# Patient Record
Sex: FEMALE | URBAN - METROPOLITAN AREA
[De-identification: names, ages, dates, MRNs, and addresses within clinical notes are randomized per-mention and may not be internally consistent; named-entity substitution may affect disease eponyms.]

---

## 2020-08-12 ENCOUNTER — APPOINTMENT (RX ONLY)
Dept: URBAN - METROPOLITAN AREA CLINIC 11 | Facility: CLINIC | Age: 60
Setting detail: DERMATOLOGY
End: 2020-08-12

## 2020-08-12 DIAGNOSIS — Z41.1 ENCOUNTER FOR COSMETIC SURGERY: ICD-10-CM

## 2020-08-12 PROCEDURE — 99003: CPT

## 2020-08-12 PROCEDURE — ? SKIN CARE REGIMEN

## 2020-08-12 PROCEDURE — ? CONSULTATION - AESTHETIC FACIAL DEFORMITY

## 2020-08-12 ASSESSMENT — LOCATION DETAILED DESCRIPTION DERM
LOCATION DETAILED: LEFT LATERAL INFERIOR EYELID
LOCATION DETAILED: LEFT MEDIAL MANDIBULAR CHEEK
LOCATION DETAILED: LEFT ANTERIOR EARLOBE
LOCATION DETAILED: RIGHT ANTERIOR EARLOBE
LOCATION DETAILED: RIGHT LATERAL INFERIOR EYELID
LOCATION DETAILED: RIGHT SUPERIOR VERMILION LIP
LOCATION DETAILED: RIGHT MEDIAL MANDIBULAR CHEEK
LOCATION DETAILED: LEFT SUPERIOR VERMILION LIP

## 2020-08-12 ASSESSMENT — LOCATION ZONE DERM
LOCATION ZONE: EAR
LOCATION ZONE: LIP
LOCATION ZONE: EYELID
LOCATION ZONE: FACE

## 2020-08-12 ASSESSMENT — LOCATION SIMPLE DESCRIPTION DERM
LOCATION SIMPLE: LEFT CHEEK
LOCATION SIMPLE: LEFT INFERIOR EYELID
LOCATION SIMPLE: RIGHT CHEEK
LOCATION SIMPLE: RIGHT EAR
LOCATION SIMPLE: RIGHT INFERIOR EYELID
LOCATION SIMPLE: LEFT LIP
LOCATION SIMPLE: RIGHT LIP
LOCATION SIMPLE: LEFT EAR

## 2020-08-12 NOTE — HPI: FACE (AESTHETIC DEFORMITY, FACE)
Is This A New Presentation, Or A Follow-Up?: Facial Aesthetic Deformity
Additional History: Patient referred by her cosmetic injector, Vivian Mchugh.

## 2020-09-15 ENCOUNTER — APPOINTMENT (RX ONLY)
Dept: URBAN - METROPOLITAN AREA CLINIC 11 | Facility: CLINIC | Age: 60
Setting detail: DERMATOLOGY
End: 2020-09-15

## 2020-09-15 DIAGNOSIS — Z41.1 ENCOUNTER FOR COSMETIC SURGERY: ICD-10-CM

## 2020-09-15 PROCEDURE — ? CONSULTATION - AESTHETIC FACIAL DEFORMITY

## 2020-09-15 PROCEDURE — 99005: CPT

## 2020-09-15 ASSESSMENT — LOCATION ZONE DERM
LOCATION ZONE: EAR
LOCATION ZONE: EYELID
LOCATION ZONE: LIP
LOCATION ZONE: FACE
LOCATION ZONE: SCALP

## 2020-09-15 ASSESSMENT — LOCATION DETAILED DESCRIPTION DERM
LOCATION DETAILED: LEFT SUPERIOR PREAURICULAR CHEEK
LOCATION DETAILED: SUBMENTAL CHIN
LOCATION DETAILED: RIGHT SUPERIOR PREAURICULAR CHEEK
LOCATION DETAILED: RIGHT ANTERIOR EARLOBE
LOCATION DETAILED: LEFT ANTERIOR EARLOBE
LOCATION DETAILED: RIGHT SUPERIOR VERMILION LIP
LOCATION DETAILED: RIGHT CENTRAL POSTAURICULAR SKIN
LOCATION DETAILED: LEFT CENTRAL POSTAURICULAR SKIN
LOCATION DETAILED: LEFT LATERAL INFERIOR EYELID
LOCATION DETAILED: LEFT SUPERIOR VERMILION LIP
LOCATION DETAILED: RIGHT LATERAL INFERIOR EYELID

## 2020-09-15 ASSESSMENT — LOCATION SIMPLE DESCRIPTION DERM
LOCATION SIMPLE: SCALP
LOCATION SIMPLE: SUBMENTAL CHIN
LOCATION SIMPLE: LEFT LIP
LOCATION SIMPLE: RIGHT INFERIOR EYELID
LOCATION SIMPLE: RIGHT CHEEK
LOCATION SIMPLE: LEFT INFERIOR EYELID
LOCATION SIMPLE: LEFT CHEEK
LOCATION SIMPLE: RIGHT LIP
LOCATION SIMPLE: LEFT EAR
LOCATION SIMPLE: RIGHT EAR

## 2020-09-15 NOTE — PROCEDURE: CONSULTATION - AESTHETIC FACIAL DEFORMITY
Consultation Charge $ (Use Numbers Only, No Text Please.): 50.00
Send Procedure Quote As Charge: No
Detail Level: Zone

## 2020-10-08 ENCOUNTER — APPOINTMENT (RX ONLY)
Dept: URBAN - METROPOLITAN AREA CLINIC 11 | Facility: CLINIC | Age: 60
Setting detail: DERMATOLOGY
End: 2020-10-08

## 2020-10-08 DIAGNOSIS — Z41.9 ENCOUNTER FOR PROCEDURE FOR PURPOSES OTHER THAN REMEDYING HEALTH STATE, UNSPECIFIED: ICD-10-CM

## 2020-10-08 PROCEDURE — ? PRESCRIPTION

## 2020-10-08 PROCEDURE — ? PRE-OP WORKLIST

## 2020-10-08 PROCEDURE — 99004: CPT

## 2020-10-08 RX ORDER — CEPHALEXIN 500 MG/1
CAPSULE ORAL
Qty: 28 | Refills: 0 | Status: ERX | COMMUNITY
Start: 2020-10-08

## 2020-10-08 RX ORDER — ACYCLOVIR 200 MG/1
CAPSULE ORAL
Qty: 30 | Refills: 0 | Status: ERX | COMMUNITY
Start: 2020-10-08

## 2020-10-08 RX ORDER — ONDANSETRON HYDROCHLORIDE 4 MG/1
TABLET, FILM COATED ORAL
Qty: 20 | Refills: 0 | Status: ERX | COMMUNITY
Start: 2020-10-08

## 2020-10-08 RX ORDER — OXYCODONE HYDROCHLORIDE AND ACETAMINOPHEN 5; 325 MG/1; MG/1
TABLET ORAL
Qty: 30 | Refills: 0 | COMMUNITY
Start: 2020-10-08

## 2020-10-08 RX ADMIN — ACYCLOVIR: 200 CAPSULE ORAL at 00:00

## 2020-10-08 RX ADMIN — CEPHALEXIN: 500 CAPSULE ORAL at 00:00

## 2020-10-08 RX ADMIN — ONDANSETRON HYDROCHLORIDE: 4 TABLET, FILM COATED ORAL at 00:00

## 2020-10-08 RX ADMIN — OXYCODONE HYDROCHLORIDE AND ACETAMINOPHEN: 5; 325 TABLET ORAL at 00:00

## 2020-10-08 ASSESSMENT — LOCATION DETAILED DESCRIPTION DERM
LOCATION DETAILED: RIGHT MEDIAL INFERIOR EYELID
LOCATION DETAILED: LEFT SUPERIOR VERMILION LIP
LOCATION DETAILED: LEFT ANTERIOR EARLOBE
LOCATION DETAILED: RIGHT LATERAL ZYGOMA
LOCATION DETAILED: RIGHT ANTERIOR EARLOBE
LOCATION DETAILED: RIGHT SUPERIOR VERMILION LIP
LOCATION DETAILED: LEFT SUPERIOR PREAURICULAR CHEEK
LOCATION DETAILED: LEFT MEDIAL INFERIOR EYELID
LOCATION DETAILED: SUBMENTAL CHIN
LOCATION DETAILED: RIGHT SUPERIOR POSTAURICULAR SKIN
LOCATION DETAILED: LEFT CENTRAL POSTAURICULAR SKIN

## 2020-10-08 ASSESSMENT — LOCATION SIMPLE DESCRIPTION DERM
LOCATION SIMPLE: RIGHT LIP
LOCATION SIMPLE: RIGHT ZYGOMA
LOCATION SIMPLE: LEFT EAR
LOCATION SIMPLE: SCALP
LOCATION SIMPLE: LEFT LIP
LOCATION SIMPLE: LEFT CHEEK
LOCATION SIMPLE: LEFT INFERIOR EYELID
LOCATION SIMPLE: SUBMENTAL CHIN
LOCATION SIMPLE: RIGHT INFERIOR EYELID
LOCATION SIMPLE: RIGHT EAR

## 2020-10-08 ASSESSMENT — LOCATION ZONE DERM
LOCATION ZONE: EAR
LOCATION ZONE: FACE
LOCATION ZONE: SCALP
LOCATION ZONE: LIP
LOCATION ZONE: EYELID

## 2020-10-08 NOTE — HPI: PREOPERATIVE APPOINTMENT
Has The Patient Completed Informed Consent?: is scheduled to complete informed consent documentation during this visit
Has The Patient Fulfilled Financial Responsibilities For Surgical Scheduling?: is scheduled to complete payment to fulfill financial responsibilities during this visit
Have Arrangements And Instructions Been Made For Patient Transportation?: transportation arrangements will be made
Date Of Procedure?: 10/21/2020
Facility: [facility]
Surgeon: Dr. Edvin Garcia

## 2020-10-08 NOTE — PROCEDURE: PRE-OP WORKLIST
Admission Status: outpatient
Surgery Scheduled: lower face and necklift, bilateral lower blepharoplasty, bilateral lower eyelid phenol croton oil chemical peel, lip lift, bilateral earlobe reduction
Date Of Surgery: 10/21/2020
Photos Taken?: yes
Detail Level: Simple
Date Of Evaluation: 10/08/2020
Surgeon: Dr. Garcia

## 2020-10-19 ENCOUNTER — APPOINTMENT (RX ONLY)
Dept: URBAN - METROPOLITAN AREA CLINIC 11 | Facility: CLINIC | Age: 60
Setting detail: DERMATOLOGY
End: 2020-10-19

## 2020-10-19 DIAGNOSIS — Z41.1 ENCOUNTER FOR COSMETIC SURGERY: ICD-10-CM

## 2020-10-19 PROCEDURE — ? CONSULTATION - AESTHETIC FACIAL DEFORMITY

## 2020-10-19 PROCEDURE — 99005: CPT

## 2020-10-19 ASSESSMENT — LOCATION SIMPLE DESCRIPTION DERM
LOCATION SIMPLE: LEFT CHEEK
LOCATION SIMPLE: RIGHT EAR
LOCATION SIMPLE: SUBMENTAL CHIN
LOCATION SIMPLE: SCALP
LOCATION SIMPLE: RIGHT CHEEK
LOCATION SIMPLE: LEFT LIP
LOCATION SIMPLE: RIGHT INFERIOR EYELID
LOCATION SIMPLE: RIGHT LIP
LOCATION SIMPLE: LEFT EAR
LOCATION SIMPLE: LEFT INFERIOR EYELID

## 2020-10-19 ASSESSMENT — LOCATION DETAILED DESCRIPTION DERM
LOCATION DETAILED: RIGHT LATERAL INFERIOR EYELID
LOCATION DETAILED: RIGHT ANTERIOR EARLOBE
LOCATION DETAILED: SUBMENTAL CHIN
LOCATION DETAILED: LEFT SUPERIOR PREAURICULAR CHEEK
LOCATION DETAILED: LEFT ANTERIOR EARLOBE
LOCATION DETAILED: RIGHT SUPERIOR PREAURICULAR CHEEK
LOCATION DETAILED: RIGHT SUPERIOR VERMILION LIP
LOCATION DETAILED: RIGHT CENTRAL POSTAURICULAR SKIN
LOCATION DETAILED: LEFT CENTRAL POSTAURICULAR SKIN
LOCATION DETAILED: LEFT LATERAL INFERIOR EYELID
LOCATION DETAILED: LEFT SUPERIOR VERMILION LIP

## 2020-10-19 ASSESSMENT — LOCATION ZONE DERM
LOCATION ZONE: SCALP
LOCATION ZONE: LIP
LOCATION ZONE: EYELID
LOCATION ZONE: EAR
LOCATION ZONE: FACE

## 2020-10-21 ENCOUNTER — APPOINTMENT (RX ONLY)
Dept: URBAN - METROPOLITAN AREA SURGERY CENTER 4 | Facility: SURGERY CENTER | Age: 60
Setting detail: DERMATOLOGY
End: 2020-10-21

## 2020-10-21 DIAGNOSIS — Z41.1 ENCOUNTER FOR COSMETIC SURGERY: ICD-10-CM

## 2020-10-21 PROCEDURE — ? SET GLOBAL PERIOD

## 2020-10-21 PROCEDURE — 15828 RHYTIDECTOMY CHEEK CHN & NCK: CPT | Mod: 50

## 2020-10-21 PROCEDURE — 40799 UNLISTED PROCEDURE LIPS: CPT

## 2020-10-21 PROCEDURE — 17999 UNLISTD PX SKN MUC MEMB SUBQ: CPT

## 2020-10-21 PROCEDURE — 15789 CHEMICAL PEEL FACIAL DERMAL: CPT | Mod: 50

## 2020-10-21 PROCEDURE — 15821 BLEPHARP LWR EYELID FAT PAD: CPT | Mod: 50

## 2020-10-21 NOTE — PROCEDURE: SET GLOBAL PERIOD
Other Surgery Performed: Bilateral SMAS Facelift with Platysmaplasty, Bilateral Lower Blepharoplasty and Chemical Peel, Liplift, Bilateral Earlobe Reduction
Detail Level: Simple
Surgery Global Period: 90
Date Of Surgery (Mm/Dd/Yyyy): 10/21/2020

## 2020-10-21 NOTE — HPI: PROCEDURE
Additional History: Bilateral SMAS Facelift with Platysmaplasty \\nBilateral Lower Blepharoplasty and Chemical Peel\\nLiplift \\nBilateral Earlobe Reduction

## 2020-10-22 ENCOUNTER — APPOINTMENT (RX ONLY)
Dept: URBAN - METROPOLITAN AREA CLINIC 11 | Facility: CLINIC | Age: 60
Setting detail: DERMATOLOGY
End: 2020-10-22

## 2020-10-22 DIAGNOSIS — Z48.89 ENCOUNTER FOR OTHER SPECIFIED SURGICAL AFTERCARE: ICD-10-CM

## 2020-10-22 DIAGNOSIS — Z42.8 ENCOUNTER FOR OTHER PLASTIC AND RECONSTRUCTIVE SURGERY FOLLOWING MEDICAL PROCEDURE OR HEALED INJURY: ICD-10-CM

## 2020-10-22 PROCEDURE — ? COUNSELING - POST-OP CHECK, BLEPHAROPLASTY

## 2020-10-22 PROCEDURE — ? DRAIN REMOVAL

## 2020-10-22 PROCEDURE — ? COUNSELING - POST-OP CHECK, FACELIFT

## 2020-10-22 PROCEDURE — ? COUNSELING - POST-OP CHECK

## 2020-10-22 PROCEDURE — 99024 POSTOP FOLLOW-UP VISIT: CPT

## 2020-10-22 PROCEDURE — ? PATIENT SPECIFIC COUNSELING

## 2020-10-22 ASSESSMENT — LOCATION DETAILED DESCRIPTION DERM
LOCATION DETAILED: RIGHT SUPERIOR PREAURICULAR CHEEK
LOCATION DETAILED: LEFT CENTRAL POSTAURICULAR SKIN
LOCATION DETAILED: RIGHT CENTRAL POSTAURICULAR SKIN
LOCATION DETAILED: LEFT LATERAL INFERIOR EYELID
LOCATION DETAILED: RIGHT SUPERIOR VERMILION LIP
LOCATION DETAILED: LEFT SUPERIOR PREAURICULAR CHEEK
LOCATION DETAILED: SUBMENTAL CHIN
LOCATION DETAILED: RIGHT MEDIAL INFERIOR EYELID
LOCATION DETAILED: LEFT INFERIOR VERMILION LIP

## 2020-10-22 ASSESSMENT — LOCATION SIMPLE DESCRIPTION DERM
LOCATION SIMPLE: LEFT INFERIOR EYELID
LOCATION SIMPLE: RIGHT LIP
LOCATION SIMPLE: LEFT LIP
LOCATION SIMPLE: LEFT CHEEK
LOCATION SIMPLE: RIGHT INFERIOR EYELID
LOCATION SIMPLE: SUBMENTAL CHIN
LOCATION SIMPLE: SCALP
LOCATION SIMPLE: RIGHT CHEEK

## 2020-10-22 ASSESSMENT — LOCATION ZONE DERM
LOCATION ZONE: FACE
LOCATION ZONE: SCALP
LOCATION ZONE: LIP
LOCATION ZONE: EYELID

## 2020-10-22 ASSESSMENT — PAIN INTENSITY VAS: HOW INTENSE IS YOUR PAIN 0 BEING NO PAIN, 10 BEING THE MOST SEVERE PAIN POSSIBLE?: 5/10 PAIN

## 2020-10-22 NOTE — PROCEDURE: PATIENT SPECIFIC COUNSELING
Other (Free Text): Exam within normal limits. Continue aquafor/lidocaine mixture, once redness is gone she can switch to vaseline
Detail Level: Simple

## 2020-10-22 NOTE — HPI: POST-OP CHECK, COSMETIC (EXTENDED)
What Best Describes The Level Of Symmetry? (Check All That Apply): good
How Severe Is Your Pain?: mild
How Is Your Wound Healing?: fresh
Compared To The Last Evaluation, How Have The Findings Changed?: stable
Date Of Procedure: 10/21/2020

## 2020-10-28 ENCOUNTER — APPOINTMENT (RX ONLY)
Dept: URBAN - METROPOLITAN AREA CLINIC 11 | Facility: CLINIC | Age: 60
Setting detail: DERMATOLOGY
End: 2020-10-28

## 2020-10-28 DIAGNOSIS — Z48.89 ENCOUNTER FOR OTHER SPECIFIED SURGICAL AFTERCARE: ICD-10-CM

## 2020-10-28 DIAGNOSIS — Z42.8 ENCOUNTER FOR OTHER PLASTIC AND RECONSTRUCTIVE SURGERY FOLLOWING MEDICAL PROCEDURE OR HEALED INJURY: ICD-10-CM

## 2020-10-28 PROCEDURE — ? PRESCRIPTION

## 2020-10-28 PROCEDURE — ? SUTURE REMOVAL

## 2020-10-28 PROCEDURE — 99024 POSTOP FOLLOW-UP VISIT: CPT

## 2020-10-28 PROCEDURE — ? COUNSELING - POST-OP CHECK, BLEPHAROPLASTY

## 2020-10-28 PROCEDURE — ? COUNSELING - POST-OP CHECK, FACELIFT

## 2020-10-28 PROCEDURE — ? PATIENT SPECIFIC COUNSELING

## 2020-10-28 PROCEDURE — ? COUNSELING - POST-OP CHECK

## 2020-10-28 RX ORDER — SILVER SULFADIAZINE 10 MG/G
CREAM TOPICAL
Qty: 1 | Refills: 3 | Status: ERX | COMMUNITY
Start: 2020-10-28

## 2020-10-28 RX ADMIN — SILVER SULFADIAZINE: 10 CREAM TOPICAL at 00:00

## 2020-10-28 ASSESSMENT — LOCATION ZONE DERM
LOCATION ZONE: EAR
LOCATION ZONE: EYELID
LOCATION ZONE: SCALP
LOCATION ZONE: LIP
LOCATION ZONE: FACE

## 2020-10-28 ASSESSMENT — LOCATION SIMPLE DESCRIPTION DERM
LOCATION SIMPLE: LEFT LIP
LOCATION SIMPLE: LEFT EAR
LOCATION SIMPLE: RIGHT TEMPLE
LOCATION SIMPLE: LEFT CHEEK
LOCATION SIMPLE: RIGHT INFERIOR EYELID
LOCATION SIMPLE: RIGHT LIP
LOCATION SIMPLE: RIGHT CHEEK
LOCATION SIMPLE: RIGHT EAR
LOCATION SIMPLE: SCALP
LOCATION SIMPLE: SUBMENTAL CHIN
LOCATION SIMPLE: LEFT TEMPLE
LOCATION SIMPLE: LEFT INFERIOR EYELID

## 2020-10-28 ASSESSMENT — LOCATION DETAILED DESCRIPTION DERM
LOCATION DETAILED: SUBMENTAL CHIN
LOCATION DETAILED: LEFT ANTERIOR EARLOBE
LOCATION DETAILED: RIGHT SUPERIOR PREAURICULAR CHEEK
LOCATION DETAILED: RIGHT MEDIAL INFERIOR EYELID
LOCATION DETAILED: RIGHT CENTRAL POSTAURICULAR SKIN
LOCATION DETAILED: RIGHT UPPER CUTANEOUS LIP
LOCATION DETAILED: LEFT SUPERIOR PREAURICULAR CHEEK
LOCATION DETAILED: RIGHT ANTERIOR EARLOBE
LOCATION DETAILED: LEFT LATERAL TEMPLE
LOCATION DETAILED: RIGHT LATERAL TEMPLE
LOCATION DETAILED: LEFT UPPER CUTANEOUS LIP
LOCATION DETAILED: LEFT INFERIOR VERMILION LIP
LOCATION DETAILED: RIGHT SUPERIOR VERMILION LIP
LOCATION DETAILED: LEFT LATERAL INFERIOR EYELID
LOCATION DETAILED: LEFT CENTRAL POSTAURICULAR SKIN

## 2020-10-28 NOTE — HPI: POST-OP CHECK, COSMETIC (EXTENDED)
How Severe Is Your Pain?: mild
How Is Your Wound Healing?: healing well
Compared To The Last Evaluation, How Have The Findings Changed?: stable

## 2020-10-28 NOTE — PROCEDURE: PATIENT SPECIFIC COUNSELING
Other (Free Text): Exam within normal limits.  Apply aquaphor under eyes at all times
Detail Level: Simple

## 2020-10-30 ENCOUNTER — APPOINTMENT (RX ONLY)
Dept: URBAN - METROPOLITAN AREA CLINIC 11 | Facility: CLINIC | Age: 60
Setting detail: DERMATOLOGY
End: 2020-10-30

## 2020-10-30 VITALS — DIASTOLIC BLOOD PRESSURE: 88 MMHG | HEART RATE: 89 BPM | SYSTOLIC BLOOD PRESSURE: 125 MMHG

## 2020-10-30 DIAGNOSIS — T81.89 OTHER COMPLICATIONS OF PROCEDURES, NOT ELSEWHERE CLASSIFIED: ICD-10-CM

## 2020-10-30 PROBLEM — T81.89XA OTHER COMPLICATIONS OF PROCEDURES, NOT ELSEWHERE CLASSIFIED, INITIAL ENCOUNTER: Status: ACTIVE | Noted: 2020-10-30

## 2020-10-30 PROCEDURE — ? EXCISIONAL DEBRIDEMENT (NO PATH)

## 2020-10-30 PROCEDURE — 99024 POSTOP FOLLOW-UP VISIT: CPT

## 2020-10-30 PROCEDURE — ? PRESCRIPTION

## 2020-10-30 RX ORDER — SULFAMETHOXAZOLE AND TRIMETHOPRIM 800; 160 MG/1; MG/1
TABLET ORAL BID
Qty: 14 | Refills: 3 | Status: ERX | COMMUNITY
Start: 2020-10-30

## 2020-10-30 RX ADMIN — SULFAMETHOXAZOLE AND TRIMETHOPRIM: 800; 160 TABLET ORAL at 00:00

## 2020-10-30 ASSESSMENT — LOCATION DETAILED DESCRIPTION DERM: LOCATION DETAILED: LEFT SUPERIOR LATERAL NECK

## 2020-10-30 ASSESSMENT — LOCATION SIMPLE DESCRIPTION DERM: LOCATION SIMPLE: NECK

## 2020-10-30 ASSESSMENT — LOCATION ZONE DERM: LOCATION ZONE: NECK

## 2020-10-30 NOTE — PROCEDURE: EXCISIONAL DEBRIDEMENT (NO PATH)
Detail Level: Detailed
Anesthesia Type: 1% lidocaine with epinephrine and a 1:10 solution of 8.4% sodium bicarbonate
Local Anesthesia Volume In Cc: 9
Debridement Level: skin and subcutaneous tissue
Post-Procedure: Following the procedure, 3-0 monocryl interrupted buried sutures were placed to repair the dermis.  Epidermis was repaired using 3-0 prolene interrupted sutures in a simple fashion. \\nThe wound was cleansed in sterile fashion. Neck skin demonstrated good viability upon closure. The site was dressed with Vaseline and Telfa dressing and Xeroform. The post care instructions were reviewed with the patient.

## 2020-11-04 ENCOUNTER — RX ONLY (OUTPATIENT)
Age: 60
Setting detail: RX ONLY
End: 2020-11-04

## 2020-11-04 RX ORDER — MUPIROCIN 20 MG/G
OINTMENT TOPICAL
Qty: 1 | Refills: 2 | COMMUNITY
Start: 2020-11-04

## 2020-11-06 ENCOUNTER — APPOINTMENT (RX ONLY)
Dept: URBAN - METROPOLITAN AREA CLINIC 11 | Facility: CLINIC | Age: 60
Setting detail: DERMATOLOGY
End: 2020-11-06

## 2020-11-06 DIAGNOSIS — T81.89 OTHER COMPLICATIONS OF PROCEDURES, NOT ELSEWHERE CLASSIFIED: ICD-10-CM

## 2020-11-06 DIAGNOSIS — Z48.89 ENCOUNTER FOR OTHER SPECIFIED SURGICAL AFTERCARE: ICD-10-CM

## 2020-11-06 DIAGNOSIS — Z42.8 ENCOUNTER FOR OTHER PLASTIC AND RECONSTRUCTIVE SURGERY FOLLOWING MEDICAL PROCEDURE OR HEALED INJURY: ICD-10-CM

## 2020-11-06 PROBLEM — T81.89XA OTHER COMPLICATIONS OF PROCEDURES, NOT ELSEWHERE CLASSIFIED, INITIAL ENCOUNTER: Status: ACTIVE | Noted: 2020-11-06

## 2020-11-06 PROCEDURE — ? COUNSELING - POST-OP CHECK, BLEPHAROPLASTY

## 2020-11-06 PROCEDURE — ? COUNSELING - POST-OP CHECK

## 2020-11-06 PROCEDURE — ? SUTURE REMOVAL

## 2020-11-06 PROCEDURE — ? PATIENT SPECIFIC COUNSELING

## 2020-11-06 PROCEDURE — ? EXCISIONAL DEBRIDEMENT (NO PATH)

## 2020-11-06 PROCEDURE — 99024 POSTOP FOLLOW-UP VISIT: CPT

## 2020-11-06 PROCEDURE — ? COUNSELING - POST-OP CHECK, FACELIFT

## 2020-11-06 ASSESSMENT — LOCATION SIMPLE DESCRIPTION DERM
LOCATION SIMPLE: LEFT ZYGOMA
LOCATION SIMPLE: LEFT INFERIOR EYELID
LOCATION SIMPLE: SCALP
LOCATION SIMPLE: RIGHT INFERIOR EYELID
LOCATION SIMPLE: NECK
LOCATION SIMPLE: RIGHT EAR
LOCATION SIMPLE: RIGHT LIP
LOCATION SIMPLE: LEFT LIP
LOCATION SIMPLE: LEFT EAR
LOCATION SIMPLE: RIGHT CHEEK

## 2020-11-06 ASSESSMENT — LOCATION ZONE DERM
LOCATION ZONE: NECK
LOCATION ZONE: EAR
LOCATION ZONE: SCALP
LOCATION ZONE: FACE
LOCATION ZONE: LIP
LOCATION ZONE: EYELID

## 2020-11-06 ASSESSMENT — LOCATION DETAILED DESCRIPTION DERM
LOCATION DETAILED: LEFT SUPERIOR VERMILION LIP
LOCATION DETAILED: LEFT ANTERIOR EARLOBE
LOCATION DETAILED: LEFT SUPERIOR LATERAL NECK
LOCATION DETAILED: RIGHT SUPERIOR VERMILION LIP
LOCATION DETAILED: RIGHT CENTRAL POSTAURICULAR SKIN
LOCATION DETAILED: RIGHT SUPERIOR PREAURICULAR CHEEK
LOCATION DETAILED: LEFT LATERAL ZYGOMA
LOCATION DETAILED: LEFT MEDIAL INFERIOR EYELID
LOCATION DETAILED: LEFT CENTRAL POSTAURICULAR SKIN
LOCATION DETAILED: RIGHT MEDIAL INFERIOR EYELID
LOCATION DETAILED: RIGHT ANTERIOR EARLOBE

## 2020-11-06 NOTE — PROCEDURE: EXCISIONAL DEBRIDEMENT (NO PATH)
Detail Level: Detailed
Local Anesthesia Volume In Cc: 6.3
Debridement Level: skin and subcutaneous tissue
Post-Procedure: Xeroform bolster secured with 3-0 Prolene sutures. Following the procedure the post care instructions were reviewed with the patient.

## 2020-11-06 NOTE — PROCEDURE: PATIENT SPECIFIC COUNSELING
Detail Level: Simple
Other (Free Text): Continue to keep aquaphor on the area until all redness resolves. Do not use mupirocin on peeled areas. Do not apply makeup to peeled areas until all redness has resolved.

## 2020-11-16 ENCOUNTER — APPOINTMENT (RX ONLY)
Dept: URBAN - METROPOLITAN AREA CLINIC 11 | Facility: CLINIC | Age: 60
Setting detail: DERMATOLOGY
End: 2020-11-16

## 2020-11-16 DIAGNOSIS — Z48.89 ENCOUNTER FOR OTHER SPECIFIED SURGICAL AFTERCARE: ICD-10-CM

## 2020-11-16 DIAGNOSIS — Z42.8 ENCOUNTER FOR OTHER PLASTIC AND RECONSTRUCTIVE SURGERY FOLLOWING MEDICAL PROCEDURE OR HEALED INJURY: ICD-10-CM

## 2020-11-16 PROCEDURE — ? COUNSELING - POST-OP CHECK

## 2020-11-16 PROCEDURE — ? COUNSELING - POST-OP CHECK, BLEPHAROPLASTY

## 2020-11-16 PROCEDURE — ? PRESCRIPTION

## 2020-11-16 PROCEDURE — ? OPERATIVE NOTE - BRIEF

## 2020-11-16 PROCEDURE — ? COUNSELING - POST-OP CHECK, FACELIFT

## 2020-11-16 PROCEDURE — 99024 POSTOP FOLLOW-UP VISIT: CPT

## 2020-11-16 PROCEDURE — ? PATIENT SPECIFIC COUNSELING

## 2020-11-16 RX ORDER — ERYTHROMYCIN 5 MG/G
OINTMENT OPHTHALMIC TID
Qty: 1 | Refills: 3 | Status: ERX | COMMUNITY
Start: 2020-11-16

## 2020-11-16 RX ADMIN — ERYTHROMYCIN: 5 OINTMENT OPHTHALMIC at 00:00

## 2020-11-16 ASSESSMENT — LOCATION ZONE DERM
LOCATION ZONE: EAR
LOCATION ZONE: EYELID
LOCATION ZONE: LIP
LOCATION ZONE: FACE
LOCATION ZONE: SCALP
LOCATION ZONE: NECK

## 2020-11-16 ASSESSMENT — LOCATION DETAILED DESCRIPTION DERM
LOCATION DETAILED: RIGHT SUPERIOR VERMILION LIP
LOCATION DETAILED: RIGHT MEDIAL INFERIOR EYELID
LOCATION DETAILED: RIGHT SUPERIOR PREAURICULAR CHEEK
LOCATION DETAILED: LEFT ANTERIOR EARLOBE
LOCATION DETAILED: RIGHT CENTRAL POSTAURICULAR SKIN
LOCATION DETAILED: LEFT CENTRAL POSTAURICULAR SKIN
LOCATION DETAILED: LEFT LATERAL ZYGOMA
LOCATION DETAILED: LEFT INFERIOR VERMILION LIP
LOCATION DETAILED: LEFT MEDIAL INFERIOR EYELID
LOCATION DETAILED: LEFT SUPERIOR LATERAL NECK
LOCATION DETAILED: RIGHT ANTERIOR EARLOBE

## 2020-11-16 ASSESSMENT — LOCATION SIMPLE DESCRIPTION DERM
LOCATION SIMPLE: LEFT LIP
LOCATION SIMPLE: RIGHT INFERIOR EYELID
LOCATION SIMPLE: LEFT INFERIOR EYELID
LOCATION SIMPLE: RIGHT CHEEK
LOCATION SIMPLE: SCALP
LOCATION SIMPLE: RIGHT EAR
LOCATION SIMPLE: RIGHT LIP
LOCATION SIMPLE: LEFT EAR
LOCATION SIMPLE: NECK
LOCATION SIMPLE: LEFT ZYGOMA

## 2020-11-16 ASSESSMENT — PAIN INTENSITY VAS: HOW INTENSE IS YOUR PAIN 0 BEING NO PAIN, 10 BEING THE MOST SEVERE PAIN POSSIBLE?: NO PAIN

## 2020-11-16 NOTE — HPI: POST-OP CHECK, COSMETIC (EXTENDED)
What Best Describes The Level Of Symmetry? (Check All That Apply): good
Compared To The Last Evaluation, How Have The Findings Changed?: improved
Date Of Procedure: 10/21/2020

## 2020-11-16 NOTE — PROCEDURE: OPERATIVE NOTE - BRIEF
Price $ (Use Numbers Only, No Text Please.): 0
Detail Level: Simple
Anesthesia Volume In Cc: 0.6
Intro: Under full, informed consent, the patient was brought to the procedure room.
Consent: Verbal consent obtained
Estimated Blood Loss (Cc): minimal
Surgeon: Dr. Garcia
Position: supine

## 2020-11-16 NOTE — PROCEDURE: COUNSELING - POST-OP CHECK
Add Postop Global No-Charge Code (12257)?: yes
Detail Level: Detailed
Add Postop Global No-Charge Code (53808)?: no
Detail Level: Simple

## 2020-12-11 ENCOUNTER — APPOINTMENT (RX ONLY)
Dept: URBAN - METROPOLITAN AREA CLINIC 11 | Facility: CLINIC | Age: 60
Setting detail: DERMATOLOGY
End: 2020-12-11

## 2020-12-11 DIAGNOSIS — Z48.89 ENCOUNTER FOR OTHER SPECIFIED SURGICAL AFTERCARE: ICD-10-CM

## 2020-12-11 DIAGNOSIS — Z42.8 ENCOUNTER FOR OTHER PLASTIC AND RECONSTRUCTIVE SURGERY FOLLOWING MEDICAL PROCEDURE OR HEALED INJURY: ICD-10-CM

## 2020-12-11 DIAGNOSIS — Z41.1 ENCOUNTER FOR COSMETIC SURGERY: ICD-10-CM

## 2020-12-11 PROCEDURE — ? PATIENT SPECIFIC COUNSELING

## 2020-12-11 PROCEDURE — ? COUNSELING - POST-OP CHECK

## 2020-12-11 PROCEDURE — ? COUNSELING - POST-OP CHECK, BLEPHAROPLASTY

## 2020-12-11 PROCEDURE — ? COUNSELING - POST-OP CHECK, FACELIFT

## 2020-12-11 PROCEDURE — 99024 POSTOP FOLLOW-UP VISIT: CPT

## 2020-12-11 PROCEDURE — ? PHOTOS OBTAINED

## 2020-12-11 PROCEDURE — ? ORDER FOR SURGERY

## 2020-12-11 ASSESSMENT — LOCATION DETAILED DESCRIPTION DERM
LOCATION DETAILED: LEFT CENTRAL POSTAURICULAR SKIN
LOCATION DETAILED: LEFT MEDIAL INFERIOR EYELID
LOCATION DETAILED: LEFT SUPERIOR LATERAL NECK
LOCATION DETAILED: RIGHT SUPERIOR VERMILION LIP
LOCATION DETAILED: LEFT LATERAL ZYGOMA
LOCATION DETAILED: LEFT INFERIOR VERMILION LIP
LOCATION DETAILED: RIGHT MEDIAL INFERIOR EYELID
LOCATION DETAILED: LEFT ANTERIOR EARLOBE
LOCATION DETAILED: RIGHT ANTERIOR EARLOBE
LOCATION DETAILED: RIGHT CENTRAL POSTAURICULAR SKIN
LOCATION DETAILED: RIGHT SUPERIOR PREAURICULAR CHEEK

## 2020-12-11 ASSESSMENT — LOCATION SIMPLE DESCRIPTION DERM
LOCATION SIMPLE: SCALP
LOCATION SIMPLE: LEFT LIP
LOCATION SIMPLE: RIGHT EAR
LOCATION SIMPLE: LEFT ZYGOMA
LOCATION SIMPLE: NECK
LOCATION SIMPLE: RIGHT CHEEK
LOCATION SIMPLE: RIGHT LIP
LOCATION SIMPLE: LEFT EAR
LOCATION SIMPLE: LEFT INFERIOR EYELID
LOCATION SIMPLE: RIGHT INFERIOR EYELID

## 2020-12-11 ASSESSMENT — LOCATION ZONE DERM
LOCATION ZONE: EYELID
LOCATION ZONE: FACE
LOCATION ZONE: SCALP
LOCATION ZONE: LIP
LOCATION ZONE: NECK
LOCATION ZONE: EAR

## 2020-12-11 NOTE — PROCEDURE: PATIENT SPECIFIC COUNSELING
Detail Level: Simple
Other (Free Text): Continue vaseline to reddened areas
Other (Free Text): Will do a lateral lip lift of no charge at next office visit

## 2020-12-11 NOTE — PROCEDURE: ORDER FOR SURGERY
Surgeon: Dr. Ezra Garcia
Comments (Optional): No charge
Estimated Length Of Surgery: 45 min
Detail Level: Simple
Provider: Edvin Garcia, DO
Surgery To Be Ordered: lateral lip lift and bilateral ear piercing
Time Frame Unit: day(s)
Date Of Surgery: TBD
Audit Log: 0
Priority: normal
Admission Status: outpatient
Cosmetic, Major Or Minor?: Cosmetic

## 2020-12-11 NOTE — PROCEDURE: PHOTOS OBTAINED
Follow-Up For Additional Photos?: no
Follow-Up Increment: 1
Photographed Locations: Photos were obtained of the face
Detail Level: Zone
Follow-Up Interval: week

## 2020-12-11 NOTE — PROCEDURE: COUNSELING - POST-OP CHECK
Add Postop Global No-Charge Code (77785)?: yes
Detail Level: Detailed
Add Postop Global No-Charge Code (80701)?: no
Detail Level: Simple

## 2020-12-11 NOTE — HPI: POST-OP CHECK, COSMETIC (EXTENDED)
How Is Your Wound Healing?: healing well
Compared To The Last Evaluation, How Have The Findings Changed?: improved
What Is Your Overall Satisfaction Level With The Current Outcome?: satisfied
Date Of Procedure: 10/21/2020

## 2021-02-18 ENCOUNTER — APPOINTMENT (RX ONLY)
Dept: URBAN - METROPOLITAN AREA CLINIC 11 | Facility: CLINIC | Age: 61
Setting detail: DERMATOLOGY
End: 2021-02-18

## 2021-02-18 DIAGNOSIS — Z48.89 ENCOUNTER FOR OTHER SPECIFIED SURGICAL AFTERCARE: ICD-10-CM

## 2021-02-18 DIAGNOSIS — Z41.1 ENCOUNTER FOR COSMETIC SURGERY: ICD-10-CM

## 2021-02-18 PROCEDURE — ? INTRALESIONAL KENALOG

## 2021-02-18 PROCEDURE — ? COUNSELING - POST-OP CHECK

## 2021-02-18 PROCEDURE — ? PIERCING

## 2021-02-18 PROCEDURE — ?

## 2021-02-18 PROCEDURE — 99005: CPT

## 2021-02-18 PROCEDURE — ? COUNSELING - POST-OP CHECK, FACELIFT

## 2021-02-18 ASSESSMENT — LOCATION ZONE DERM
LOCATION ZONE: NECK
LOCATION ZONE: LIP
LOCATION ZONE: SCALP
LOCATION ZONE: FACE
LOCATION ZONE: EAR

## 2021-02-18 ASSESSMENT — LOCATION SIMPLE DESCRIPTION DERM
LOCATION SIMPLE: RIGHT EAR
LOCATION SIMPLE: LEFT LIP
LOCATION SIMPLE: RIGHT CHEEK
LOCATION SIMPLE: LEFT EAR
LOCATION SIMPLE: RIGHT LIP
LOCATION SIMPLE: LEFT ANTERIOR NECK
LOCATION SIMPLE: LEFT ZYGOMA
LOCATION SIMPLE: SCALP

## 2021-02-18 ASSESSMENT — LOCATION DETAILED DESCRIPTION DERM
LOCATION DETAILED: LEFT SUPERIOR LATERAL NECK
LOCATION DETAILED: RIGHT SUPERIOR PREAURICULAR CHEEK
LOCATION DETAILED: RIGHT CENTRAL POSTAURICULAR SKIN
LOCATION DETAILED: LEFT ANTERIOR EARLOBE
LOCATION DETAILED: RIGHT SUPERIOR VERMILION LIP
LOCATION DETAILED: LEFT SUPERIOR VERMILION LIP
LOCATION DETAILED: LEFT CENTRAL POSTAURICULAR SKIN
LOCATION DETAILED: RIGHT ANTERIOR EARLOBE
LOCATION DETAILED: LEFT LATERAL ZYGOMA

## 2021-02-18 NOTE — PROCEDURE: LIP LIFT
Detail Level: Detailed
Estimated Blood Loss (Cc): minimal
Procedure, Left Side: Lip Lift
Surgeon: Dr. Garcia
Epidermal Closure: running subcuticular
Consent: The benefits, alternatives, risks, and complications of the procedure were discussed including, but not limited to, the risks of pain, infection, bleeding, persistent or worse cosmetic defect, delayed healing, interference with eating/kissing/speaking, requirement for additional procedures, asymmetry, oral incompetence, drooling, Cupid Perley deformity, and dissatisfaction with aesthetic outcome.
Epidermal Sutures: 5-0 Ethilon
Positioning: The patient was placed in the standard supine position in the usual manner.
Hemostasis: pressure
Anesthesia Type: 1% lidocaine with epinephrine
Suture Removal: 7 days
Anesthesia Volume In Cc: 2.6
Dermal Sutures: 5-0 Monocryl

## 2021-02-18 NOTE — PROCEDURE: PIERCING
Anesthesia Volume In Cc: 0
Consent: Informed consent was obtained from the patient. The risks of the procedure were discussed in detail. Specifically, the risks of infection, scarring, bleeding, prolonged wound healing, pain and allergic reaction were addressed. Prior to the procedure, the piercing site was clearly identified and confirmed by the patient.
Surgeon: Dr. Ezra Garcia
Detail Level: Simple
Estimated Blood Loss (Cc): minimal
Piercing Tool: 18g x 1 needle
Anesthesia Type: 1% lidocaine with epinephrine

## 2021-02-18 NOTE — PROCEDURE: INTRALESIONAL KENALOG
Administered By (Optional): Dr. Garcia
Detail Level: Detailed
Expiration Date (Optional): 12/2021
Consent: The risks of atrophy were reviewed with the patient.
Lot # (Optional): [id number]
Total Volume (Ccs): 0.2
Concentration (Mg/Ml): 40
Kenalog Preparation: kenalog with 1% lidocaine with epinephrine and a 1:10 solution of 8.4% sodium bicarbonate

## 2021-02-26 ENCOUNTER — APPOINTMENT (RX ONLY)
Dept: URBAN - METROPOLITAN AREA CLINIC 11 | Facility: CLINIC | Age: 61
Setting detail: DERMATOLOGY
End: 2021-02-26

## 2021-02-26 DIAGNOSIS — Z48.89 ENCOUNTER FOR OTHER SPECIFIED SURGICAL AFTERCARE: ICD-10-CM

## 2021-02-26 PROCEDURE — ? PRESCRIPTION

## 2021-02-26 PROCEDURE — ? SUTURE REMOVAL

## 2021-02-26 PROCEDURE — ? COUNSELING - POST-OP CHECK

## 2021-02-26 PROCEDURE — 99024 POSTOP FOLLOW-UP VISIT: CPT

## 2021-02-26 RX ORDER — ERYTHROMYCIN 5 MG/G
OINTMENT OPHTHALMIC TID
Qty: 1 | Refills: 3 | Status: ERX | COMMUNITY
Start: 2021-02-26

## 2021-02-26 RX ADMIN — ERYTHROMYCIN: 5 OINTMENT OPHTHALMIC at 00:00

## 2021-02-26 ASSESSMENT — LOCATION SIMPLE DESCRIPTION DERM
LOCATION SIMPLE: RIGHT EAR
LOCATION SIMPLE: LEFT EAR
LOCATION SIMPLE: RIGHT LIP
LOCATION SIMPLE: LEFT LIP

## 2021-02-26 ASSESSMENT — LOCATION ZONE DERM
LOCATION ZONE: EAR
LOCATION ZONE: LIP

## 2021-02-26 ASSESSMENT — LOCATION DETAILED DESCRIPTION DERM
LOCATION DETAILED: LEFT SUPERIOR VERMILION LIP
LOCATION DETAILED: RIGHT SUPERIOR VERMILION LIP
LOCATION DETAILED: LEFT ANTERIOR EARLOBE
LOCATION DETAILED: LEFT INFERIOR VERMILION LIP
LOCATION DETAILED: RIGHT ANTERIOR EARLOBE

## 2021-02-26 ASSESSMENT — PAIN INTENSITY VAS: HOW INTENSE IS YOUR PAIN 0 BEING NO PAIN, 10 BEING THE MOST SEVERE PAIN POSSIBLE?: NO PAIN

## 2021-02-26 NOTE — HPI: POST-OP CHECK, COSMETIC (EXTENDED)
What Best Describes The Level Of Symmetry? (Check All That Apply): good
How Is Your Wound Healing?: healing well
Compared To The Last Evaluation, How Have The Findings Changed?: stable
Date Of Procedure: 2/18/2021

## 2021-03-16 ENCOUNTER — APPOINTMENT (RX ONLY)
Dept: URBAN - METROPOLITAN AREA CLINIC 11 | Facility: CLINIC | Age: 61
Setting detail: DERMATOLOGY
End: 2021-03-16

## 2021-03-16 DIAGNOSIS — Z48.89 ENCOUNTER FOR OTHER SPECIFIED SURGICAL AFTERCARE: ICD-10-CM

## 2021-03-16 PROCEDURE — ? ADDITIONAL NOTES

## 2021-03-16 PROCEDURE — 99024 POSTOP FOLLOW-UP VISIT: CPT

## 2021-03-16 PROCEDURE — ? INTRALESIONAL KENALOG

## 2021-03-16 PROCEDURE — ? COUNSELING - POST-OP CHECK, FACELIFT

## 2021-03-16 PROCEDURE — ? COUNSELING - POST-OP CHECK

## 2021-03-16 PROCEDURE — ? PHOTOS OBTAINED

## 2021-03-16 ASSESSMENT — LOCATION ZONE DERM
LOCATION ZONE: FACE
LOCATION ZONE: LIP
LOCATION ZONE: EAR
LOCATION ZONE: SCALP
LOCATION ZONE: NECK

## 2021-03-16 ASSESSMENT — LOCATION DETAILED DESCRIPTION DERM
LOCATION DETAILED: LEFT INFERIOR VERMILION LIP
LOCATION DETAILED: RIGHT SUPERIOR VERMILION LIP
LOCATION DETAILED: RIGHT CENTRAL POSTAURICULAR SKIN
LOCATION DETAILED: LEFT CENTRAL POSTAURICULAR SKIN
LOCATION DETAILED: RIGHT ANTERIOR EARLOBE
LOCATION DETAILED: LEFT LATERAL ZYGOMA
LOCATION DETAILED: LEFT ANTERIOR EARLOBE
LOCATION DETAILED: RIGHT SUPERIOR PREAURICULAR CHEEK
LOCATION DETAILED: LEFT SUPERIOR LATERAL NECK

## 2021-03-16 ASSESSMENT — LOCATION SIMPLE DESCRIPTION DERM
LOCATION SIMPLE: SCALP
LOCATION SIMPLE: RIGHT LIP
LOCATION SIMPLE: LEFT ZYGOMA
LOCATION SIMPLE: RIGHT CHEEK
LOCATION SIMPLE: LEFT EAR
LOCATION SIMPLE: LEFT LIP
LOCATION SIMPLE: LEFT ANTERIOR NECK
LOCATION SIMPLE: RIGHT EAR

## 2021-03-16 NOTE — PROCEDURE: PHOTOS OBTAINED
Photographed Locations: Photos were obtained of the left side of neck
Follow-Up Increment: 1
Follow-Up Interval: week
Detail Level: Zone
Follow-Up For Additional Photos?: no

## 2021-03-16 NOTE — PROCEDURE: ADDITIONAL NOTES
Additional Notes: Patient forgot to bring earrings today for ears piercing. Patient stated she will bring earrings in for next visit.
Render Risk Assessment In Note?: no

## 2021-03-16 NOTE — PROCEDURE: INTRALESIONAL KENALOG
Administered By (Optional): Dr. Garcia
Detail Level: Detailed
Expiration Date (Optional): 12/2021
Consent: The risks of atrophy were reviewed with the patient.
Lot # (Optional): [id number]
Total Volume (Ccs): 0.2
Concentration (Mg/Ml): 40
Kenalog Preparation: kenalog with 1% lidocaine with epinephrine and a 1:10 solution of 8.4% sodium bicarbonate

## 2021-05-14 ENCOUNTER — APPOINTMENT (RX ONLY)
Dept: URBAN - METROPOLITAN AREA CLINIC 11 | Facility: CLINIC | Age: 61
Setting detail: DERMATOLOGY
End: 2021-05-14

## 2021-05-14 DIAGNOSIS — Z48.89 ENCOUNTER FOR OTHER SPECIFIED SURGICAL AFTERCARE: ICD-10-CM

## 2021-05-14 DIAGNOSIS — Z41.1 ENCOUNTER FOR COSMETIC SURGERY: ICD-10-CM

## 2021-05-14 DIAGNOSIS — Z41.9 ENCOUNTER FOR PROCEDURE FOR PURPOSES OTHER THAN REMEDYING HEALTH STATE, UNSPECIFIED: ICD-10-CM

## 2021-05-14 PROCEDURE — ? COUNSELING - POST-OP CHECK, FACELIFT

## 2021-05-14 PROCEDURE — ? FILLERS

## 2021-05-14 PROCEDURE — ? PIERCING

## 2021-05-14 PROCEDURE — ? INTRALESIONAL KENALOG (COSMETIC)

## 2021-05-14 PROCEDURE — ? ORDER FOR SURGERY

## 2021-05-14 PROCEDURE — 99005: CPT

## 2021-05-14 PROCEDURE — ? COUNSELING - POST-OP CHECK

## 2021-05-14 ASSESSMENT — LOCATION DETAILED DESCRIPTION DERM
LOCATION DETAILED: RIGHT CENTRAL POSTAURICULAR SKIN
LOCATION DETAILED: RIGHT UPPER CUTANEOUS LIP
LOCATION DETAILED: RIGHT SUPERIOR VERMILION LIP
LOCATION DETAILED: LEFT ANTERIOR EARLOBE
LOCATION DETAILED: LEFT LATERAL ZYGOMA
LOCATION DETAILED: LEFT INFERIOR VERMILION LIP
LOCATION DETAILED: RIGHT SUPERIOR PREAURICULAR CHEEK
LOCATION DETAILED: LEFT CENTRAL POSTAURICULAR SKIN
LOCATION DETAILED: RIGHT ANTERIOR EARLOBE

## 2021-05-14 ASSESSMENT — LOCATION SIMPLE DESCRIPTION DERM
LOCATION SIMPLE: RIGHT LIP
LOCATION SIMPLE: LEFT ZYGOMA
LOCATION SIMPLE: SCALP
LOCATION SIMPLE: LEFT LIP
LOCATION SIMPLE: LEFT EAR
LOCATION SIMPLE: RIGHT EAR
LOCATION SIMPLE: RIGHT CHEEK

## 2021-05-14 ASSESSMENT — LOCATION ZONE DERM
LOCATION ZONE: FACE
LOCATION ZONE: LIP
LOCATION ZONE: SCALP
LOCATION ZONE: EAR

## 2021-05-14 ASSESSMENT — PAIN INTENSITY VAS: HOW INTENSE IS YOUR PAIN 0 BEING NO PAIN, 10 BEING THE MOST SEVERE PAIN POSSIBLE?: NO PAIN

## 2021-05-14 NOTE — PROCEDURE: ORDER FOR SURGERY
Facility: in office OP
Audit Log: 0
Date Of Surgery: TBD
Priority: normal
Photos Taken?: no
Time Frame Unit: day(s)
Comments (Optional): No charge
Provider: Edvin Garcia, DO
Estimated Length Of Surgery: 1 hr
Surgery To Be Ordered: Fat grafting
Detail Level: Simple
Admission Status: outpatient
Cosmetic, Major Or Minor?: Cosmetic
Surgeon: Dr. Ezra Garcia

## 2021-05-14 NOTE — PROCEDURE: PIERCING
Anesthesia Volume In Cc: 0.3
Piercing Tool: 18g x 1 needle
Surgeon: Dr. Ezra Garcia
Consent: Informed consent was obtained from the patient. The risks of the procedure were discussed in detail. Specifically, the risks of infection, scarring, bleeding, prolonged wound healing, pain and allergic reaction were addressed. Prior to the procedure, the piercing site was clearly identified and confirmed by the patient.
Estimated Blood Loss (Cc): minimal
Detail Level: Simple
Anesthesia Type: 1% lidocaine with epinephrine

## 2021-05-14 NOTE — PROCEDURE: FILLERS
Mid Face Filler Volume In Cc: 0
Use Map Statement For Sites (Optional): No
Topical Anesthetic Base: cream
Topical Anesthetic #1: lidocaine
Additional Area 1 Location: lips
Map Statment: See attached map for complete details
Additional Area 1 Volume In Cc: 1
Expiration Date (Month Year): 2022-08-31
Topical Anesthesia?: yes
Detail Level: Simple
Topical Anesthetic #2: prilocaine
Topical Anesthetic Time (In Min): 5
Consent: Verbal and written consent obtained. Risks include but not limited to bruising, beading, irregular texture, ulceration, infection, allergic reaction, scar formation, incomplete augmentation, temporary nature, procedural pain.
Administered By (Optional): Dr. Ezra Garcia
Injected Anesthesia In Cc: 0.6
Filler: Restylane Kysse
Lot #: [id number]
Topical Anesthetic #3: phenenylephrine

## 2021-05-14 NOTE — HPI: POST-OP CHECK, COSMETIC (EXTENDED)
What Best Describes The Level Of Symmetry? (Check All That Apply): good
How Is Your Wound Healing?: healed
Compared To The Last Evaluation, How Have The Findings Changed?: improved
Date Of Procedure: 10/2020

## 2021-05-14 NOTE — PROCEDURE: INTRALESIONAL KENALOG (COSMETIC)
Concentration (Mg/Ml): 40
Kenalog Preparation: kenalog with 1% lidocaine with epinephrine
Consent: The risks of atrophy were reviewed with the patient.
Total Volume (Ccs): 0.3
Lot # (Optional): [id number]
Expiration Date (Optional): DEC 2021
Administered By (Optional): Dr. Ezra Garcia
Detail Level: Detailed

## 2021-10-18 ENCOUNTER — APPOINTMENT (RX ONLY)
Dept: URBAN - METROPOLITAN AREA CLINIC 11 | Facility: CLINIC | Age: 61
Setting detail: DERMATOLOGY
End: 2021-10-18

## 2021-10-18 DIAGNOSIS — Z41.1 ENCOUNTER FOR COSMETIC SURGERY: ICD-10-CM

## 2021-10-18 PROCEDURE — ? FAT GRAFTING

## 2021-10-18 PROCEDURE — 99005: CPT

## 2021-10-18 ASSESSMENT — LOCATION SIMPLE DESCRIPTION DERM
LOCATION SIMPLE: LEFT LIP
LOCATION SIMPLE: RIGHT LIP

## 2021-10-18 ASSESSMENT — LOCATION ZONE DERM: LOCATION ZONE: LIP

## 2021-10-18 ASSESSMENT — LOCATION DETAILED DESCRIPTION DERM
LOCATION DETAILED: RIGHT SUPERIOR VERMILION LIP
LOCATION DETAILED: LEFT SUPERIOR VERMILION LIP

## 2021-10-18 NOTE — PROCEDURE: FAT GRAFTING
Positioning: The patient was placed supine for the procedure.
Puregraft: Liposuction and processing was performed using the Puregraft system.
Power Assistance: The power-assisted liposuction device was utilized during fat retrieval.
Laser assistance was used to help emulsify fat for retrieval. Laser energy was delivered with the laser cannula, being careful to protect the skin at the incision site and to avoid end-hits.
Pre-Tunneling: Pretunneling was performed bluntly prior to infiltration of wetting solution.
Estimated Blood Loss: minimal
Washing: The aspirated fat was washed with lactated ringers after harvesting.
Liposuction Technique: Liposuction was performed with a fanning technique, being careful to avoid oversuctioning any regions and protecting against end-hits.
Vaser Assistance: The Vaser device was used to help emulsify fat for retrieval. Vaser energy was delivered with the Vaser cannula, being careful to protect the skin at the incision site and to avoid end-hits.
Graft Location 1: upper lip
Anesthesia Volume In Cc: 43
Graft Placement: The prepared fat was injected with blunt cannula injection technique, placing thin threads of fat in a fanning fashion through stab incisions around the recipient area. Care was paid to avoidance of intravascular injection or bolus injection.
Antibiotic Washing: The aspirated fat was washed with antibiotic solution to improve graft take and reduce infection risk.
Centrifuging: The aspirated fat was placed into small tubes and centrifuged at 3000rpm for 3 minutes. The supernatant was decanted to achieve compact fat for grafting.
Volume 1 (Cc): 5
Consent: The risks, benefits, expectations and alternatives of liposuction were discussed with the patient preoperatively and include but are not limited to: infection, bruising, lumpiness, pain, anesthesia reaction, dysesthesia, scarring in treatment area or puncture point, vasovagal reactions, tachycardia, nausea, necrosis, ulceration, color change and asymmetry.
Total Supernatant Fat Aspirate Volume (Cc): 7
Enzymatic digestion was performe to isolate autologous stem cells from the aspirate.
Detail Level: Detailed
Graft Location 2: lower lip
Centrifuging (Manual): The aspirated fat was placed into syringes and manually centrifuged at moderate speed. The supernatant was decanted and discarded.
Indication: Cosmetic
Decanting: The aspirated fat was allowed to sit undisturbed for 5 mintues. The supernatant was decanted and discarded.
Surgeon: Dr. Garcia
Filter/Strain: The aspirated fat was placed through a filter. The strained fat was transferred into syringes for injection.
Volume 2 (Cc): 2
Price $ (Use Numbers Only, No Text Please.): 0
Pre-Tunneling Performed?: No
Hand [place] [employer]: Fat was harvested by hand with a 60cc syringe and a multihole cannula using a fanning motion with care not to overresect.
Epidermal Closure - Donor Site: simple interrupted
Ultrasound Assistance: Ultrasound-assistance was used to help emulsify fat for retrieval. Ultrasound energy was delivered with the ultrasound cannula, being careful to protect the skin at the incision site and to avoid end-hits.
Compression Garment: A compression garment was applied to create compression in the areas of suction, but no signficant compression in grafted regions.
Infiltration: Wetting solution was infiltrated into the treatment area using infiltration cannulae, distributing the solution evenly until satisfactory tissue turgor was achieved.
Gauze Filtration: The aspirate was filtered through Telfa gauze to remove supernatant and rolled to achieve compact fat for grafting.
Revolve: Liposuction was performed using the Revolve system. Upon completion of harvest, the aspirate was doubly washed with warm lactated ringers and agitated for at least 30 seconds each time. Each time the wash was drained. The fat was then transferred to syringes.

## 2021-11-02 ENCOUNTER — APPOINTMENT (RX ONLY)
Dept: URBAN - METROPOLITAN AREA CLINIC 11 | Facility: CLINIC | Age: 61
Setting detail: DERMATOLOGY
End: 2021-11-02

## 2021-11-02 DIAGNOSIS — Z48.89 ENCOUNTER FOR OTHER SPECIFIED SURGICAL AFTERCARE: ICD-10-CM

## 2021-11-02 PROCEDURE — ? COUNSELING - POST-OP CHECK, FAT TRANSFER

## 2021-11-02 PROCEDURE — ? SUTURE REMOVAL

## 2021-11-02 PROCEDURE — 99024 POSTOP FOLLOW-UP VISIT: CPT

## 2021-11-02 ASSESSMENT — LOCATION SIMPLE DESCRIPTION DERM
LOCATION SIMPLE: RIGHT LOWER BACK
LOCATION SIMPLE: RIGHT LIP
LOCATION SIMPLE: LEFT LIP

## 2021-11-02 ASSESSMENT — LOCATION ZONE DERM
LOCATION ZONE: TRUNK
LOCATION ZONE: LIP

## 2021-11-02 ASSESSMENT — LOCATION DETAILED DESCRIPTION DERM
LOCATION DETAILED: RIGHT SUPERIOR MEDIAL LOWER BACK
LOCATION DETAILED: RIGHT SUPERIOR VERMILION LIP
LOCATION DETAILED: LEFT INFERIOR VERMILION LIP

## 2022-01-03 ENCOUNTER — RX ONLY (OUTPATIENT)
Age: 62
Setting detail: RX ONLY
End: 2022-01-03

## 2022-01-03 RX ORDER — ACYCLOVIR 400 MG/1
TABLET ORAL
Qty: 15 | Refills: 0 | Status: ERX | COMMUNITY
Start: 2022-01-03

## 2022-01-12 ENCOUNTER — APPOINTMENT (RX ONLY)
Dept: URBAN - METROPOLITAN AREA CLINIC 11 | Facility: CLINIC | Age: 62
Setting detail: DERMATOLOGY
End: 2022-01-12

## 2022-01-12 DIAGNOSIS — Z41.9 ENCOUNTER FOR PROCEDURE FOR PURPOSES OTHER THAN REMEDYING HEALTH STATE, UNSPECIFIED: ICD-10-CM

## 2022-01-12 PROCEDURE — 15789 CHEMICAL PEEL FACIAL DERMAL: CPT

## 2022-01-12 PROCEDURE — ? CHEMICAL PEEL (CUSTOM)

## 2022-01-12 ASSESSMENT — LOCATION ZONE DERM: LOCATION ZONE: LIP

## 2022-01-12 ASSESSMENT — LOCATION SIMPLE DESCRIPTION DERM
LOCATION SIMPLE: RIGHT LIP
LOCATION SIMPLE: LEFT LIP

## 2022-01-12 ASSESSMENT — LOCATION DETAILED DESCRIPTION DERM
LOCATION DETAILED: LEFT UPPER CUTANEOUS LIP
LOCATION DETAILED: RIGHT LOWER CUTANEOUS LIP
LOCATION DETAILED: LEFT LOWER CUTANEOUS LIP
LOCATION DETAILED: RIGHT UPPER CUTANEOUS LIP

## 2022-01-12 NOTE — HPI: COSMETIC (CHEMICAL PEEL)
Have You Had A Chemical Peel Before?: has had a previous peel
When Was Your Last Peel?: 10/21/2020
Additional History: Patient reports she has been using clarifying serum and tretinoin as directed.

## 2022-01-12 NOTE — PROCEDURE: CHEMICAL PEEL (CUSTOM)
Prep: The treated area was degreased with alcohol.
Erythema: mild
Consent: Prior to the procedure, written consent was obtained and risks, benefits, expectations and alternatives were reviewed, including, but not limited to,  redness, peeling, blistering, pigmentary change, scarring, infection, and pain.
Frostin+
Number Of Coats: 1
Price $ (Use Numbers Only, No Text Please.): 6,113.86
Detail Level: Simple

## 2022-01-20 ENCOUNTER — APPOINTMENT (RX ONLY)
Dept: URBAN - METROPOLITAN AREA CLINIC 11 | Facility: CLINIC | Age: 62
Setting detail: DERMATOLOGY
End: 2022-01-20

## 2022-01-20 DIAGNOSIS — Z42.8 ENCOUNTER FOR OTHER PLASTIC AND RECONSTRUCTIVE SURGERY FOLLOWING MEDICAL PROCEDURE OR HEALED INJURY: ICD-10-CM

## 2022-01-20 PROCEDURE — 99024 POSTOP FOLLOW-UP VISIT: CPT

## 2022-01-20 PROCEDURE — ? PATIENT SPECIFIC COUNSELING

## 2022-01-20 ASSESSMENT — LOCATION DETAILED DESCRIPTION DERM
LOCATION DETAILED: RIGHT UPPER CUTANEOUS LIP
LOCATION DETAILED: LEFT UPPER CUTANEOUS LIP

## 2022-01-20 ASSESSMENT — LOCATION SIMPLE DESCRIPTION DERM
LOCATION SIMPLE: LEFT LIP
LOCATION SIMPLE: RIGHT LIP

## 2022-01-20 ASSESSMENT — LOCATION ZONE DERM: LOCATION ZONE: LIP

## 2022-01-20 NOTE — PROCEDURE: PATIENT SPECIFIC COUNSELING
Detail Level: Detailed
Other (Free Text): Begin soothing complex with the Hydrate Lux on top TID for 3 weeks. Protect from the sun at all times. Do not resume skin care routine at this time.

## 2024-01-11 NOTE — PROCEDURE: COUNSELING - POST-OP CHECK, FACELIFT
Detail Level: Simple Quality 226: Preventive Care And Screening: Tobacco Use: Screening And Cessation Intervention: Patient screened for tobacco use and is an ex/non-smoker Quality 130: Documentation Of Current Medications In The Medical Record: Current Medications Documented Detail Level: Detailed Quality 431: Preventive Care And Screening: Unhealthy Alcohol Use - Screening: Patient not identified as an unhealthy alcohol user when screened for unhealthy alcohol use using a systematic screening method